# Patient Record
(demographics unavailable — no encounter records)

---

## 2025-07-20 NOTE — REVIEW OF SYSTEMS
[Hearing Loss] : hearing loss [Dysuria] : dysuria [Frequency] : frequency [Negative] : Heme/Lymph [Earache] : no earache

## 2025-07-20 NOTE — HISTORY OF PRESENT ILLNESS
[FreeTextEntry1] : CPE [de-identified] : seen and examined with daughter present sustained superficial abrasions to bilateral pre-tibial regions and has been using bacitracin and DSD overall status is stable  taking meds as noted

## 2025-07-20 NOTE — HEALTH RISK ASSESSMENT
[Good] : ~his/her~  mood as  good [No] : In the past 12 months have you used drugs other than those required for medical reasons? No [No falls in past year] : Patient reported no falls in the past year [0] : 1) Little interest or pleasure doing things: Not at all (0) [1] : 2) Feeling down, depressed, or hopeless for several days (1) [With Family] : lives with family [Reports changes in hearing] : Reports changes in hearing [Reports changes in vision] : Reports changes in vision [Smoke Detector] : smoke detector [Carbon Monoxide Detector] : carbon monoxide detector [Safety elements used in home] : safety elements used in home [Seat Belt] :  uses seat belt [FreeTextEntry1] : cuts on leg [Audit-CScore] : 0 [de-identified] : ok [[id number]] : 1 [Reports changes in dental health] : Reports no changes in dental health [de-identified] : need help [de-identified] : need help

## 2025-07-20 NOTE — HEALTH RISK ASSESSMENT
[Good] : ~his/her~  mood as  good [No] : In the past 12 months have you used drugs other than those required for medical reasons? No [No falls in past year] : Patient reported no falls in the past year [0] : 1) Little interest or pleasure doing things: Not at all (0) [1] : 2) Feeling down, depressed, or hopeless for several days (1) [With Family] : lives with family [Reports changes in hearing] : Reports changes in hearing [Reports changes in vision] : Reports changes in vision [Smoke Detector] : smoke detector [Carbon Monoxide Detector] : carbon monoxide detector [Safety elements used in home] : safety elements used in home [Seat Belt] :  uses seat belt [FreeTextEntry1] : cuts on leg [Audit-CScore] : 0 [de-identified] : ok [[id number]] : 1 [Reports changes in dental health] : Reports no changes in dental health [de-identified] : need help [de-identified] : need help

## 2025-07-20 NOTE — PHYSICAL EXAM
[No Acute Distress] : no acute distress [Normal Outer Ear/Nose] : the outer ears and nose were normal in appearance [Normal Rate] : normal rate  [Soft] : abdomen soft [Non Tender] : non-tender [Alert and Oriented x3] : oriented to person, place, and time [de-identified] : systolicmurmur at apex [de-identified] : superficial skin abrasions pretibial region bilatrally [de-identified] : most of history obtained from daughter [de-identified] : verbal alert answers questions appropriately

## 2025-07-20 NOTE — HISTORY OF PRESENT ILLNESS
[FreeTextEntry1] : CPE [de-identified] : seen and examined with daughter present sustained superficial abrasions to bilateral pre-tibial regions and has been using bacitracin and DSD overall status is stable  taking meds as noted

## 2025-07-20 NOTE — PHYSICAL EXAM
[No Acute Distress] : no acute distress [Normal Outer Ear/Nose] : the outer ears and nose were normal in appearance [Normal Rate] : normal rate  [Soft] : abdomen soft [Non Tender] : non-tender [Alert and Oriented x3] : oriented to person, place, and time [de-identified] : systolicmurmur at apex [de-identified] : superficial skin abrasions pretibial region bilatrally [de-identified] : most of history obtained from daughter [de-identified] : verbal alert answers questions appropriately

## 2025-07-20 NOTE — HEALTH RISK ASSESSMENT
[Good] : ~his/her~  mood as  good [No] : In the past 12 months have you used drugs other than those required for medical reasons? No [No falls in past year] : Patient reported no falls in the past year [0] : 1) Little interest or pleasure doing things: Not at all (0) [1] : 2) Feeling down, depressed, or hopeless for several days (1) [With Family] : lives with family [Reports changes in hearing] : Reports changes in hearing [Reports changes in vision] : Reports changes in vision [Smoke Detector] : smoke detector [Carbon Monoxide Detector] : carbon monoxide detector [Safety elements used in home] : safety elements used in home [Seat Belt] :  uses seat belt [FreeTextEntry1] : cuts on leg [Audit-CScore] : 0 [de-identified] : ok [[id number]] : 1 [Reports changes in dental health] : Reports no changes in dental health [de-identified] : need help [de-identified] : need help

## 2025-07-20 NOTE — ASSESSMENT
[Vaccines Reviewed] : Immunizations reviewed today. Please see immunization details in the vaccine log within the immunization flowsheet.  [FreeTextEntry1] : no interval status change home situation appears to be safe sits with good support in wheelchair

## 2025-07-20 NOTE — PLAN
[FreeTextEntry1] : will see Dr Lira for possible repeat ECHO  declined [facility] services for wound management to continue Neosporin blood sent for routine labs

## 2025-07-20 NOTE — PHYSICAL EXAM
[No Acute Distress] : no acute distress [Normal Outer Ear/Nose] : the outer ears and nose were normal in appearance [Normal Rate] : normal rate  [Soft] : abdomen soft [Non Tender] : non-tender [Alert and Oriented x3] : oriented to person, place, and time [de-identified] : systolicmurmur at apex [de-identified] : superficial skin abrasions pretibial region bilatrally [de-identified] : most of history obtained from daughter [de-identified] : verbal alert answers questions appropriately

## 2025-07-20 NOTE — HISTORY OF PRESENT ILLNESS
[FreeTextEntry1] : CPE [de-identified] : seen and examined with daughter present sustained superficial abrasions to bilateral pre-tibial regions and has been using bacitracin and DSD overall status is stable  taking meds as noted